# Patient Record
Sex: FEMALE | Race: WHITE | NOT HISPANIC OR LATINO | URBAN - METROPOLITAN AREA
[De-identification: names, ages, dates, MRNs, and addresses within clinical notes are randomized per-mention and may not be internally consistent; named-entity substitution may affect disease eponyms.]

---

## 2019-08-16 ENCOUNTER — OFFICE VISIT (OUTPATIENT)
Dept: URBAN - METROPOLITAN AREA CLINIC 17 | Facility: CLINIC | Age: 69
End: 2019-08-16
Payer: MEDICARE

## 2019-08-16 DIAGNOSIS — Z96.1 PRESENCE OF INTRAOCULAR LENS: ICD-10-CM

## 2019-08-16 DIAGNOSIS — H04.123 DRY EYE SYNDROME OF BILATERAL LACRIMAL GLANDS: ICD-10-CM

## 2019-08-16 PROCEDURE — 99214 OFFICE O/P EST MOD 30 MIN: CPT | Performed by: OPTOMETRIST

## 2019-08-16 ASSESSMENT — INTRAOCULAR PRESSURE
OD: 20
OS: 20

## 2019-08-16 ASSESSMENT — KERATOMETRY
OD: 37.88
OS: 36.88

## 2019-08-16 NOTE — IMPRESSION/PLAN
Impression: Dry eye syndrome of bilateral lacrimal glands: H04.123. Plan: Dry eyes account for the patient's complaints. There is no evidence of permanent changes to the cornea. Explained condition does not have a cure and will need artificial tears for maintenance.  Refresh coupon given

## 2019-08-16 NOTE — IMPRESSION/PLAN
Impression: Presence of intraocular lens: Z96.1.  Plan: IOL in good position, will continue to monitor

## 2019-08-16 NOTE — IMPRESSION/PLAN
Impression: Type 2 diabetes mellitus w/o complication: Y55.4. Plan: Diabetes type II: no background retinopathy, no signs of neovascularization noted. Discussed ocular and systemic benefits of blood sugar control.

## 2020-10-09 ENCOUNTER — OFFICE VISIT (OUTPATIENT)
Dept: URBAN - METROPOLITAN AREA CLINIC 17 | Facility: CLINIC | Age: 70
End: 2020-10-09
Payer: MEDICARE

## 2020-10-09 DIAGNOSIS — E11.9 TYPE 2 DIABETES MELLITUS W/O COMPLICATION: Primary | ICD-10-CM

## 2020-10-09 DIAGNOSIS — H11.153 PINGUECULA, BILATERAL: ICD-10-CM

## 2020-10-09 DIAGNOSIS — H25.812 COMBINED FORMS OF AGE-RELATED CATARACT, LEFT EYE: ICD-10-CM

## 2020-10-09 PROCEDURE — 92014 COMPRE OPH EXAM EST PT 1/>: CPT | Performed by: OPTOMETRIST

## 2020-10-09 ASSESSMENT — INTRAOCULAR PRESSURE
OD: 14
OS: 15

## 2020-10-09 NOTE — IMPRESSION/PLAN
Impression: Combined forms of age-related cataract, left eye: H25.812. Plan: Discussed diagnosis with patient. Cataract evaluation is recommended. Will refer to Dr. Tasha Berkowitz for CAT Eval. Pt wishes to proceed.

## 2020-10-09 NOTE — IMPRESSION/PLAN
Impression: Type 2 diabetes mellitus w/o complication: I87.2. Plan: Diabetes type II: no background retinopathy, no signs of neovascularization noted. Discussed ocular and systemic benefits of blood sugar control.

## 2020-10-09 NOTE — IMPRESSION/PLAN
Impression: Vitreous degeneration, bilateral: H43.813. Plan: There is no evidence of retinal pathology. Recommend the patient return to office yearly for follow up.

## 2020-10-09 NOTE — IMPRESSION/PLAN
Impression: Presence of intraocular lens: Z96.1. Right. Plan: IOL(s) positioned well.  Monitor with yearly dilated eye exam.

## 2020-10-23 ENCOUNTER — OFFICE VISIT (OUTPATIENT)
Dept: URBAN - METROPOLITAN AREA CLINIC 17 | Facility: CLINIC | Age: 70
End: 2020-10-23
Payer: MEDICARE

## 2020-10-23 PROCEDURE — 92004 COMPRE OPH EXAM NEW PT 1/>: CPT | Performed by: OPHTHALMOLOGY

## 2020-10-23 ASSESSMENT — KERATOMETRY
OS: 36.88
OD: 38.13

## 2020-10-23 ASSESSMENT — INTRAOCULAR PRESSURE
OS: 18
OD: 14

## 2020-10-23 ASSESSMENT — VISUAL ACUITY
OD: 20/25
OS: 20/40

## 2020-10-23 NOTE — IMPRESSION/PLAN
Impression: Presence of intraocular lens: Z96.1. Right. S/p 12/29/2015 CE IOL OD  Plan: IOL in good position. Will continue to monitor with yearly exams.

## 2020-10-23 NOTE — IMPRESSION/PLAN
Impression: Combined forms of age-related cataract, left eye: H25.812. S/P Myopic LASIK Plan: Cataract accounts for patient's complaints. Reviewed risks, benefits, and procedure. Patient desires surgery, schedule ce/iol OS, RL2, standard IOL, distance refractive target, patient is clear for surgery in [place] [id number]. Discussed with patient the possible need for second surgery due to history of LASIK. Recommend ORA and Ascan in [place] to help prevent secondary surgery.

## 2020-12-21 ENCOUNTER — PRE-OPERATIVE VISIT (OUTPATIENT)
Dept: URBAN - METROPOLITAN AREA CLINIC 23 | Facility: CLINIC | Age: 70
End: 2020-12-21
Payer: MEDICARE

## 2020-12-21 PROCEDURE — 76519 ECHO EXAM OF EYE: CPT | Performed by: OPHTHALMOLOGY

## 2020-12-21 RX ORDER — OFLOXACIN 3 MG/ML
0.3 % SOLUTION/ DROPS OPHTHALMIC
Qty: 5 | Refills: 1 | Status: INACTIVE
Start: 2020-12-21 | End: 2021-05-03

## 2020-12-21 RX ORDER — PREDNISOLONE ACETATE 10 MG/ML
1 % SUSPENSION/ DROPS OPHTHALMIC
Qty: 10 | Refills: 1 | Status: INACTIVE
Start: 2020-12-21 | End: 2021-05-03

## 2020-12-21 ASSESSMENT — PACHYMETRY
OS: 3.45
OS: 27.04
OD: 2.00
OD: 26.77

## 2021-01-06 ENCOUNTER — SURGERY (OUTPATIENT)
Dept: URBAN - METROPOLITAN AREA SURGERY 11 | Facility: SURGERY | Age: 71
End: 2021-01-06
Payer: MEDICARE

## 2021-01-06 PROCEDURE — 66984 XCAPSL CTRC RMVL W/O ECP: CPT | Performed by: OPHTHALMOLOGY

## 2021-01-07 ENCOUNTER — POST-OPERATIVE VISIT (OUTPATIENT)
Dept: URBAN - METROPOLITAN AREA CLINIC 17 | Facility: CLINIC | Age: 71
End: 2021-01-07
Payer: MEDICARE

## 2021-01-07 PROCEDURE — 99024 POSTOP FOLLOW-UP VISIT: CPT | Performed by: OPTOMETRIST

## 2021-01-07 ASSESSMENT — INTRAOCULAR PRESSURE
OS: 18
OD: 14

## 2021-01-07 NOTE — IMPRESSION/PLAN
Impression: S/P CE/Standard IOL SA60WF+ 21.00 OS - 1 Day. Presence of intraocular lens  Z96.1. Post operative instructions reviewed - Discussed corneal abrasion and patient will have discomfort.  Plan: RTC in 1 week for PO2 OS:
--Continue Ofloxacin 0.3% QID x 1 week then d/c
--Taper Prednisolone acetate 1% QID x 2 wk, TID x 1 wk, BID x 1wk, QD x 1wk, then d/c
--Use AT for comfort

## 2021-01-12 ENCOUNTER — POST-OPERATIVE VISIT (OUTPATIENT)
Dept: URBAN - METROPOLITAN AREA CLINIC 17 | Facility: CLINIC | Age: 71
End: 2021-01-12
Payer: MEDICARE

## 2021-01-12 DIAGNOSIS — Z48.810 ENCOUNTER FOR SURGICAL AFTERCARE FOLLOWING SURGERY ON A SENSE ORGAN: Primary | ICD-10-CM

## 2021-01-12 PROCEDURE — 99024 POSTOP FOLLOW-UP VISIT: CPT | Performed by: OPTOMETRIST

## 2021-01-12 ASSESSMENT — INTRAOCULAR PRESSURE
OD: 12
OS: 13

## 2021-01-12 NOTE — IMPRESSION/PLAN
Impression: S/P CE/Standard IOL SA60WF+ 21.00 OS - 6 Days. Encounter for surgical aftercare following surgery on a sense organ  Z48.810. Plan: --Finish Ocuflox--Taper Prednisolone acetate 1% QID x 1 wk, TID x 1 wk, BID x 1wk, QD x 1wk, then d/c--Advised patient to use artificial tears for comfort. Detail Level: Zone Note Text (......Xxx Chief Complaint.): This diagnosis correlates with the Other (Free Text): Patient advised to continue using neutrogena oil free acne wash.

## 2021-02-03 ENCOUNTER — POST-OPERATIVE VISIT (OUTPATIENT)
Dept: URBAN - METROPOLITAN AREA CLINIC 17 | Facility: CLINIC | Age: 71
End: 2021-02-03
Payer: MEDICARE

## 2021-02-03 PROCEDURE — 99024 POSTOP FOLLOW-UP VISIT: CPT | Performed by: OPTOMETRIST

## 2021-02-03 ASSESSMENT — VISUAL ACUITY: OS: 20/25+1

## 2021-02-03 ASSESSMENT — INTRAOCULAR PRESSURE
OD: 14
OS: 15

## 2021-02-03 NOTE — IMPRESSION/PLAN
Impression: S/P CE/Standard IOL SA60WF+ 21.00 OS - 28 Days. Presence of intraocular lens  Z96.1.  Plan: 3 MOS DE --Taper Pred-Acetate QD x 1wk then D/C

## 2021-05-03 ENCOUNTER — OFFICE VISIT (OUTPATIENT)
Dept: URBAN - METROPOLITAN AREA CLINIC 17 | Facility: CLINIC | Age: 71
End: 2021-05-03
Payer: MEDICARE

## 2021-05-03 DIAGNOSIS — H43.813 VITREOUS DEGENERATION, BILATERAL: ICD-10-CM

## 2021-05-03 PROCEDURE — 92014 COMPRE OPH EXAM EST PT 1/>: CPT | Performed by: OPTOMETRIST

## 2021-05-03 ASSESSMENT — INTRAOCULAR PRESSURE
OD: 12
OS: 11

## 2021-05-03 NOTE — IMPRESSION/PLAN
Impression: Type 2 diabetes mellitus w/o complication: K83.5. Plan: No diabetic retinopathy today. Discussed importance of closely monitoring and controlling glucose to minimize risks of progression of disease. Patient instructed to notify clinic immediately if changes to vision are noted.

## 2022-06-08 ENCOUNTER — OFFICE VISIT (OUTPATIENT)
Dept: URBAN - METROPOLITAN AREA CLINIC 17 | Facility: CLINIC | Age: 72
End: 2022-06-08
Payer: MEDICARE

## 2022-06-08 DIAGNOSIS — Z96.1 PRESENCE OF INTRAOCULAR LENS: ICD-10-CM

## 2022-06-08 DIAGNOSIS — D23.122 OTHER BENIGN NEOPLASM OF SKIN OF LEFT LOWER EYELID, INCLUDING CANTHUS: ICD-10-CM

## 2022-06-08 DIAGNOSIS — E11.9 TYPE 2 DIABETES MELLITUS W/O COMPLICATION: Primary | ICD-10-CM

## 2022-06-08 DIAGNOSIS — H04.123 DRY EYE SYNDROME OF BILATERAL LACRIMAL GLANDS: ICD-10-CM

## 2022-06-08 DIAGNOSIS — H52.03 HYPERMETROPIA, BILATERAL: ICD-10-CM

## 2022-06-08 DIAGNOSIS — H17.89 OTHER CORNEAL SCAR: ICD-10-CM

## 2022-06-08 DIAGNOSIS — H43.813 VITREOUS DEGENERATION, BILATERAL: ICD-10-CM

## 2022-06-08 DIAGNOSIS — H52.4 PRESBYOPIA: ICD-10-CM

## 2022-06-08 DIAGNOSIS — H26.491 OTHER SECONDARY CATARACT, RIGHT EYE: ICD-10-CM

## 2022-06-08 PROCEDURE — 99214 OFFICE O/P EST MOD 30 MIN: CPT | Performed by: OPTOMETRIST

## 2022-06-08 ASSESSMENT — INTRAOCULAR PRESSURE
OD: 13
OS: 13

## 2022-06-08 ASSESSMENT — VISUAL ACUITY
OS: 20/25
OD: 20/20

## 2022-06-08 NOTE — IMPRESSION/PLAN
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 s/p LASIK OU Plan: No treatment is required at this time. Will continue to observe condition and or symptoms.

## 2022-06-08 NOTE — IMPRESSION/PLAN
Impression: Other benign neoplasm of skin of left lower eyelid, including canthus: D23.122. Small cyst LLL Plan: Discussed treatment options with patient. Recommend evaluation with Dr. Haven Gaxiola for further treatment options.

## 2022-06-08 NOTE — IMPRESSION/PLAN
Impression: Dry eye syndrome of bilateral lacrimal glands: H04.123. Plan: Discussed diagnosis with patient. Recommend frequent use of PF artificial tears for comfort.

## 2022-06-08 NOTE — IMPRESSION/PLAN
Impression: Type 2 diabetes mellitus w/o complication: I36.5. Bilateral. Plan: Diabetes type II: no background retinopathy, no signs of neovascularization noted. Discussed ocular and systemic benefits of blood sugar control.

## 2023-06-07 ENCOUNTER — OFFICE VISIT (OUTPATIENT)
Dept: URBAN - METROPOLITAN AREA CLINIC 17 | Facility: CLINIC | Age: 73
End: 2023-06-07
Payer: MEDICARE

## 2023-06-07 DIAGNOSIS — H43.813 VITREOUS DEGENERATION, BILATERAL: ICD-10-CM

## 2023-06-07 DIAGNOSIS — Z96.1 PRESENCE OF INTRAOCULAR LENS: ICD-10-CM

## 2023-06-07 DIAGNOSIS — E11.9 TYPE 2 DIABETES MELLITUS W/O COMPLICATION: Primary | ICD-10-CM

## 2023-06-07 DIAGNOSIS — H26.491 OTHER SECONDARY CATARACT, RIGHT EYE: ICD-10-CM

## 2023-06-07 DIAGNOSIS — H16.223 KERATOCONJUNCTIVITIS SICCA, NOT SPECIFIED AS SJÖGREN'S, BILATERAL: ICD-10-CM

## 2023-06-07 DIAGNOSIS — H17.89 OTHER CORNEAL SCAR: ICD-10-CM

## 2023-06-07 PROCEDURE — 99214 OFFICE O/P EST MOD 30 MIN: CPT | Performed by: OPTOMETRIST

## 2023-06-07 ASSESSMENT — INTRAOCULAR PRESSURE
OD: 17
OS: 14

## 2023-06-07 NOTE — IMPRESSION/PLAN
Impression: Type 2 diabetes mellitus w/o complication: D23.9. Plan: Diabetes type II: no background retinopathy, no signs of neovascularization noted. Discussed ocular and systemic benefits of blood sugar control.

## 2024-06-25 ENCOUNTER — OFFICE VISIT (OUTPATIENT)
Dept: URBAN - METROPOLITAN AREA CLINIC 17 | Facility: CLINIC | Age: 74
End: 2024-06-25
Payer: MEDICARE

## 2024-06-25 DIAGNOSIS — H43.813 VITREOUS DEGENERATION, BILATERAL: ICD-10-CM

## 2024-06-25 DIAGNOSIS — H26.493 OTHER SECONDARY CATARACT, BILATERAL: ICD-10-CM

## 2024-06-25 DIAGNOSIS — Z96.1 PRESENCE OF INTRAOCULAR LENS: ICD-10-CM

## 2024-06-25 DIAGNOSIS — E11.9 TYPE 2 DIABETES MELLITUS W/O COMPLICATION: Primary | ICD-10-CM

## 2024-06-25 DIAGNOSIS — H17.89 OTHER CORNEAL SCAR: ICD-10-CM

## 2024-06-25 PROCEDURE — 92014 COMPRE OPH EXAM EST PT 1/>: CPT | Performed by: OPTOMETRIST

## 2024-06-25 ASSESSMENT — INTRAOCULAR PRESSURE
OS: 16
OD: 15